# Patient Record
Sex: FEMALE | NOT HISPANIC OR LATINO | URBAN - METROPOLITAN AREA
[De-identification: names, ages, dates, MRNs, and addresses within clinical notes are randomized per-mention and may not be internally consistent; named-entity substitution may affect disease eponyms.]

---

## 2024-10-30 ENCOUNTER — APPOINTMENT (RX ONLY)
Dept: URBAN - METROPOLITAN AREA CLINIC 78 | Facility: CLINIC | Age: 44
Setting detail: DERMATOLOGY
End: 2024-10-30

## 2024-10-30 DIAGNOSIS — Z41.9 ENCOUNTER FOR PROCEDURE FOR PURPOSES OTHER THAN REMEDYING HEALTH STATE, UNSPECIFIED: ICD-10-CM

## 2024-10-30 PROCEDURE — ? JUVEDERM ULTRA XC INJECTION

## 2024-10-30 PROCEDURE — ? JUVEDERM VOLUMA XC INJECTION

## 2024-10-30 PROCEDURE — ? ADDITIONAL NOTES

## 2024-10-30 ASSESSMENT — LOCATION DETAILED DESCRIPTION DERM
LOCATION DETAILED: RIGHT UPPER CUTANEOUS LIP
LOCATION DETAILED: LEFT CENTRAL MALAR CHEEK
LOCATION DETAILED: LEFT UPPER CUTANEOUS LIP
LOCATION DETAILED: RIGHT INFERIOR CENTRAL MALAR CHEEK

## 2024-10-30 ASSESSMENT — LOCATION SIMPLE DESCRIPTION DERM
LOCATION SIMPLE: RIGHT CHEEK
LOCATION SIMPLE: LEFT LIP
LOCATION SIMPLE: LEFT CHEEK
LOCATION SIMPLE: RIGHT LIP

## 2024-10-30 ASSESSMENT — LOCATION ZONE DERM
LOCATION ZONE: LIP
LOCATION ZONE: FACE

## 2024-10-31 ENCOUNTER — RX ONLY (OUTPATIENT)
Age: 44
Setting detail: RX ONLY
End: 2024-10-31

## 2024-10-31 ENCOUNTER — APPOINTMENT (RX ONLY)
Dept: URBAN - METROPOLITAN AREA CLINIC 78 | Facility: CLINIC | Age: 44
Setting detail: DERMATOLOGY
End: 2024-10-31

## 2024-10-31 RX ORDER — TRETIONIN 1 MG/G
CREAM TOPICAL
Qty: 20 | Refills: 0 | Status: ERX | COMMUNITY
Start: 2024-10-31

## 2025-04-29 ENCOUNTER — APPOINTMENT (OUTPATIENT)
Dept: URBAN - METROPOLITAN AREA CLINIC 78 | Facility: CLINIC | Age: 45
Setting detail: DERMATOLOGY
End: 2025-04-29

## 2025-04-29 DIAGNOSIS — Z41.9 ENCOUNTER FOR PROCEDURE FOR PURPOSES OTHER THAN REMEDYING HEALTH STATE, UNSPECIFIED: ICD-10-CM

## 2025-04-29 PROCEDURE — ? FILLERS

## 2025-04-29 NOTE — PROCEDURE: FILLERS
Jawline Filler Volume In Cc: 0
Detail Level: Detailed
Lot #: [id number]
Filler: Juvederm Ultra XC
Include Cannula Brand?: DermaSculpt
Include Cannula Information In Note?: No
Expiration Date (Month Year): 2026-06-04
Include Cannula Size?: 25G
Include Cannula Length?: 1.5 inch
Vermilion Lips Filler Volume In Cc: 1
Map Statment: See Attach Map for Complete Details
Consent: Written consent obtained. Risks include but not limited to bruising, beading, irregular texture, ulceration, vascular occlusion, infection, allergic reaction, scar formation, incomplete augmentation, temporary nature, and procedural pain.
Post-Care Instructions: After the procedure, patient instructed to apply ice to reduce swelling.
Additional Area 1 Location: chin
Include Documentation That Aspiration Was Performed Prior To Injecting Filler:: Yes
Lot #: [id number]
Aspiration Statement: Aspiration performed prior to each injection for 10 seconds. Aspiration was negative for flash.
Lot #: [id number]
Additional Area 2 Location: Prejowl sulcus
Expiration Date (Month Year): 2/16/2025
Topical Anesthesia?: BLT (benzocaine 23%, lidocaine 10%, tetracaine 10%)
Expiration Date (Month Year): 2026-03-09
Additional Area 1 Location: perioral